# Patient Record
Sex: FEMALE | Race: WHITE | URBAN - METROPOLITAN AREA
[De-identification: names, ages, dates, MRNs, and addresses within clinical notes are randomized per-mention and may not be internally consistent; named-entity substitution may affect disease eponyms.]

---

## 2017-10-01 ENCOUNTER — HOSPITAL ENCOUNTER (OUTPATIENT)
Dept: MEDSURG UNIT | Facility: HOSPITAL | Age: 42
End: 2017-10-02
Attending: OTOLARYNGOLOGY | Admitting: OTOLARYNGOLOGY

## 2017-10-01 LAB
ABS NEUT (OLG): 10.32 X10(3)/MCL (ref 2.1–9.2)
ALBUMIN SERPL-MCNC: 3.5 GM/DL (ref 3.4–5)
ALBUMIN/GLOB SERPL: 1 RATIO (ref 1.1–2)
ALP SERPL-CCNC: 118 UNIT/L (ref 38–126)
ALT SERPL-CCNC: 23 UNIT/L (ref 12–78)
APTT PPP: 30.2 SECOND(S) (ref 24.8–36.9)
AST SERPL-CCNC: 20 UNIT/L (ref 15–37)
BASOPHILS # BLD AUTO: 0 X10(3)/MCL (ref 0–0.2)
BASOPHILS NFR BLD AUTO: 0 %
BILIRUB SERPL-MCNC: 0.3 MG/DL (ref 0.2–1)
BILIRUBIN DIRECT+TOT PNL SERPL-MCNC: 0.1 MG/DL (ref 0–0.5)
BILIRUBIN DIRECT+TOT PNL SERPL-MCNC: 0.2 MG/DL (ref 0–0.8)
BUN SERPL-MCNC: 13 MG/DL (ref 7–18)
CALCIUM SERPL-MCNC: 8.6 MG/DL (ref 8.5–10.1)
CHLORIDE SERPL-SCNC: 102 MMOL/L (ref 98–107)
CO2 SERPL-SCNC: 26 MMOL/L (ref 21–32)
CREAT SERPL-MCNC: 0.85 MG/DL (ref 0.55–1.02)
EOSINOPHIL # BLD AUTO: 0.1 X10(3)/MCL (ref 0–0.9)
EOSINOPHIL NFR BLD AUTO: 0 %
ERYTHROCYTE [DISTWIDTH] IN BLOOD BY AUTOMATED COUNT: 13.2 % (ref 11.5–17)
GLOBULIN SER-MCNC: 3.6 GM/DL (ref 2.4–3.5)
GLUCOSE SERPL-MCNC: 88 MG/DL (ref 74–106)
HCT VFR BLD AUTO: 38.7 % (ref 37–47)
HGB BLD-MCNC: 13.2 GM/DL (ref 12–16)
INR PPP: 0.9 (ref 0–1.27)
LYMPHOCYTES # BLD AUTO: 1.8 X10(3)/MCL (ref 0.6–4.6)
LYMPHOCYTES NFR BLD AUTO: 14 %
MCH RBC QN AUTO: 28.6 PG (ref 27–31)
MCHC RBC AUTO-ENTMCNC: 34.1 GM/DL (ref 33–36)
MCV RBC AUTO: 83.8 FL (ref 80–94)
MONOCYTES # BLD AUTO: 0.8 X10(3)/MCL (ref 0.1–1.3)
MONOCYTES NFR BLD AUTO: 6 %
NEUTROPHILS # BLD AUTO: 10.32 X10(3)/MCL (ref 1.4–7.9)
NEUTROPHILS NFR BLD AUTO: 79 %
PLATELET # BLD AUTO: 251 X10(3)/MCL (ref 130–400)
PMV BLD AUTO: 8.8 FL (ref 9.4–12.4)
POTASSIUM SERPL-SCNC: 3.3 MMOL/L (ref 3.5–5.1)
PROT SERPL-MCNC: 7.1 GM/DL (ref 6.4–8.2)
PROTHROMBIN TIME: 12 SECOND(S) (ref 12.2–14.7)
RBC # BLD AUTO: 4.62 X10(6)/MCL (ref 4.2–5.4)
SODIUM SERPL-SCNC: 139 MMOL/L (ref 136–145)
WBC # SPEC AUTO: 13.1 X10(3)/MCL (ref 4.5–11.5)

## 2022-04-30 NOTE — OP NOTE
DATE OF SURGERY:    10/01/2017    SURGEON:  Veto May Jr., MD    PREOPERATIVE DIAGNOSIS:  Esophageal foreign body.    POSTOPERATIVE DIAGNOSIS:  Esophageal foreign body.    INDICATIONS:  The patient is a pleasant 41-year-old woman, who earlier today was cleaning up a cake from her daughter's baby shower.  She took a swab of icing on her finger and swallowed it and immediately noticed she swallowed a foreign body.  She was unsure exactly what this was.  It was confirmed on lateral film neck x-ray.  Given that, we decided to proceed with exploration and removal of foreign body.    PROCEDURE IN DETAIL:    1. Direct laryngoscopy.  2. Rigid esophagoscopy.  3. Removal of esophageal foreign body.    ANESTHESIA:  General.    COMPLICATIONS:  None.    ESTIMATED BLOOD LOSS:  None.    PROCEDURE IN DETAIL:  The patient was brought to the operating room and was identified by name and clinic number.  She was transferred to the operating room table in supine position.  General anesthesia was induced and orotracheal intubation was uncomplicated.  The head was turned 90 degrees in preparation for the procedure.  Mouth guard was placed to protect the upper teeth.  Rigid esophagoscope was atraumatically entered into the upper esophagus.  Just below the cricopharyngeus was a large silver decorative star.  This was grasped with the operative forceps and the telescope.  It came out without significant trauma and no significant bleeding in the esophagus.     Rigid esophagoscope was reentered and passed the entire length of the esophagus down to the gastroesophageal junction.  There were no further foreign bodies.  No other areas of trauma.  Scope was     removed.  She tolerated this well.  She was turned back over to the anesthesia team to wake up.  She awoke without complications and returned to the recovery room in stable condition.        ______________________________  MD ANDREA River Jr./ADNIEL  DD:  10/01/2017   Time:  06:29PM  DT:  10/02/2017  Time:  07:18AM  Job #:  [id number]

## 2022-04-30 NOTE — DISCHARGE SUMMARY
DISCHARGE DATE:  10/02/2017    CHIEF COMPLAINT:  Esophageal foreign body.    HISTORY OF PRESENT ILLNESS:  Christine Sharp was picking up after her daughter's baby shower, earlier was picking up a cake, took a swallow of icing on her finger from around the cakes border and accidentally swallowed a foreign body.  She was taken to the operating room and after rigid esophagoscopy, as decorative star was removed out of the esophagus.  She tolerated this well, was transferred to the floor, and started progressing her diet and tolerated this well.    PHYSICAL EXAMINATION:  NECK:  Soft.  No crepitus.  Trachea is midline.     HEENT:  No tongue swelling or floor of mouth swelling.  No bleeding from the mouth.    IMPRESSION:  Esophageal foreign body, status post removal.    DISCHARGE INSTRUCTIONS:  She is doing well.  She can advance her diet.  I will see her in short follow up as an outpatient.  She knows the signs or symptoms to look for concerning for mediastinitis and to call sooner should that be the case.        ______________________________  MD ANDREA River Jr./FRANKLIN  DD:  10/11/2017  Time:  07:01PM  DT:  10/12/2017  Time:  03:11PM  Job #:  [id number]

## 2022-04-30 NOTE — ED PROVIDER NOTES
"   Patient:   Christine Sharp            MRN: [id number]            FIN: [id number]               Age:   41 years     Sex:  Female     :  1975   Associated Diagnoses:   Esophageal foreign body   Author:   Tiny VELASCO, Amarjit FAULKENR      Basic Information   Time seen: Date & time 10/1/2017 11:37:00.   History source: Patient.   Arrival mode: Walking.   History limitation: None.   Additional information: Patient's physician(s): Mara VELASCO , NANCY Santiago, Dr. Franks, assumed care of this patient at 1255. , Chief Complaint from Nursing Triage Note : Chief Complaint   10/1/2017 11:34 CDT      Chief Complaint           pt. reports she took bite of cake with "tiny diamonds" in it and when "it went down, she couldn't breathe well"; pt. states she cannot swallow now; pt. able to talk with clear voice in triage, but is crying  .      History of Present Illness   The patient presents with an ingested foreign body, SORT:  Female who reports swallowing decorations on cake now with burning throat discomfort and foreign body sensation.  Jessica CONTRERAS and     41 year old female presents to the ED with complaints of a foreign body sensation after swallowing "fake diamonds and shiny stars" onset PTA. The patient reports that she was at a baby shower when she ate a piece of cake decorated with "fake diamonds and shiny stars" around the base feeling that she had lodged these objects in her throat she was prompted to visit the ED for evaluation. The patient denies a previous history of dysphagia or throat problems. The patient has a history of anxiety, HTN, and depression.      .  The onset was just prior to arrival.  The course/duration of symptoms is constant.  Symptoms: pain and scratchy throat.  Location: esophagus. The type of foreign body is "shiny stars and fake diamonds".  The degree of symptoms is minimal.  The exacerbating factor is none.  The relieving factor is none.  Risk factors consist of none.  Prior " "episodes: none.  Therapy today: none.  Associated symptoms: none.  Additional history: none.        Review of Systems   Constitutional symptoms:  Negative except as documented in HPI.   Skin symptoms:  Negative except as documented in HPI.   Eye symptoms:  Negative except as documented in HPI.   ENMT symptoms:  Negative except as documented in HPI, foreign body sensation after swallowing "shiny stars and fake diamonds".    Respiratory symptoms:  Negative except as documented in HPI.   Cardiovascular symptoms:  Negative except as documented in HPI.   Gastrointestinal symptoms:  Negative except as documented in HPI.   Genitourinary symptoms:  Negative except as documented in HPI.   Musculoskeletal symptoms:  Negative except as documented in HPI.   Neurologic symptoms:  Negative except as documented in HPI.   Psychiatric symptoms:  Negative except as documented in HPI.   Endocrine symptoms:  Negative except as documented in HPI.   Hematologic/Lymphatic symptoms:  Negative except as documented in HPI.   Allergy/immunologic symptoms:  Negative except as documented in HPI.             Additional review of systems information: All other systems reviewed and otherwise negative.      Health Status   Allergies: No known allergies.   Medications:  (Selected)   Inpatient Medications  Completed  glucagon: 1 mg, form: Injection, IM, As Directed, first dose 10/01/17 11:37:00 CDT, 1,263,046  Documented Medications  Documented  ALPRAZOLAM 1 MG TABS: 1 mg = 1 tab(s), Oral, BID  DULOXETINE HCL 60 MG CPEP: 60 mg = 1 cap(s), Oral, Daily  QUETIAPINE FUMARATE 25 MG TABS:   Completed  Percocet 5/325: 1 tab(s), Oral, q3hr, PRN pain, 0 Refill(s)  Percocet 5/325: 2 tab(s), Oral, q3hr, PRN pain, 0 Refill(s).   Immunizations: NONE.   Menstrual history: Hysterectomy.      Past Medical/ Family/ Social History   Medical history:    Active  Endometriosis (5493559400)  Resolved  Hypertension (17615255):  Resolved.  Depression ([id number]):  " Resolved..   Surgical history:    Hysterectomy Total Abdominal w/ BSO (.) performed by John VELASCO, Irene LUX on 3/18/2014 at 38 Years.  Comments:  3/18/2014 08:45 - Mikey BENDER , Rachel DONG  auto-populated from documented surgical case  Cholecystectomy (ICD-9-CM 51.2) on 2010 at 35 Years.   section (SNOMED CT 10827840).  Tubal ligation (SNOMED CT 561030062)..   Family history:    Father  Metastatic cancer  Primary malignant neoplasm of brain  Mother  Hypertension  Brother    History is negative.  Sister  Lung  Uterine  .   Social history: Alcohol use: Denies, Tobacco use: Denies, Drug use: Denies, Occupation: Employed, Family/social situation:  and lives with her six kids.   Problem list: Per nurse's notes.      Physical Examination               Vital Signs   Vital Signs   10/1/2017 11:34 CDT      Temperature Oral          36.8 DegC                             Peripheral Pulse Rate     104 bpm  HI                             Respiratory Rate          24 br/min                             SpO2                      100 %                             Oxygen Therapy            Room air                             Systolic Blood Pressure   176 mmHg  HI                             Diastolic Blood Pressure  131 mmHg  HI  .   Measurements   10/1/2017 11:34 CDT      Weight Dosing             68 kg                             Weight Measured and Calculated in Lbs     149.91 lb                             Weight Estimated          68 kg                             Height/Length Dosing      160 cm                             Height/Length Estimated   160 cm                             Body Mass Index Estimated 26.56 kg/m2  .   Basic Oxygen Information   10/1/2017 12:45 CDT      SpO2                      100 %                             Oxygen Therapy            Room air    10/1/2017 12:43 CDT      SpO2                      98 %    10/1/2017 11:55 CDT      SpO2                      98 %                              Oxygen Therapy            Room air    10/1/2017 11:34 CDT      SpO2                      100 %                             Oxygen Therapy            Room air  .   General:  Alert, no acute distress, anxious, Not ill-appearing,    Skin:  Warm, dry, no rash, normal for ethnicity.    Head:  Normocephalic, atraumatic.    Neck:  Supple, trachea midline, no tenderness, no JVD, no carotid bruit.    Eye:  Pupils are equal, round and reactive to light, extraocular movements are intact.    Ears, nose, mouth and throat:  Tympanic membranes clear, oral mucosa moist, no pharyngeal erythema or exudate.    Cardiovascular:  Regular rate and rhythm, No murmur, Normal peripheral perfusion, No edema.    Respiratory:  Lungs are clear to auscultation, respirations are non-labored, breath sounds are equal.    Chest wall:  No tenderness.   Back:  Nontender, Normal range of motion, Normal alignment, no step-offs.    Musculoskeletal:  Normal ROM, normal strength, no tenderness, no swelling.    Gastrointestinal:  Soft, Nontender, Non distended, Normal bowel sounds, No organomegaly.    Genitourinary:  no CVA tenderness.   Neurological:  Alert and oriented to person, place, time, and situation, No focal neurological deficit observed, CN II-XII intact, normal sensory observed, normal motor observed, normal speech observed, normal coordination observed.    Lymphatics:  No lymphadenopathy.   Psychiatric:  Cooperative, appropriate mood & affect, normal judgment.       Medical Decision Making   Differential Diagnosis:  Foreign body, throat.   Documents reviewed:  Emergency department nurses' notes.   Orders  Launch Order Profile (Selected)   Inpatient Orders  Ordered  Discharge Planning Ongoing Assessment: 10/04/17 9:00:00 CDT, q3day  Completed  XR Chest 2 Views: Stat, 10/01/17 11:37:00 CDT, Other (please specify), swallowed foreign body, None, Stretcher, Rad Type, 10/01/17 11:37:00 CDT  XR Soft Tissue Neck: Stat, 10/01/17 11:37:00 CDT, Other  (please specify), swallowed foreign body, None, Stretcher, Rad Type, 10/01/17 11:37:00 CDT  glucagon: 1 mg, 1 EA, Injection, N/A, Once, Stop date 10/01/17 11:47:14 CDT, Physician Stop, 10/01/17 11:47:14 CDT  glucagon: 1 mg, form: Injection, IM, As Directed, first dose 10/01/17 11:37:00 CDT, 1,263,046.   Results review:  Lab results : Lab View   10/1/2017 13:40 CDT      WBC                       13.1 x10(3)/mcL  HI                             RBC                       4.62 x10(6)/mcL                             Hgb                       13.2 gm/dL                             Hct                       38.7 %                             Platelet                  251 x10(3)/mcL                             MCV                       83.8 fL                             MCH                       28.6 pg                             MCHC                      34.1 gm/dL                             RDW                       13.2 %                             MPV                       8.8 fL  LOW                             Abs Neut                  10.32 x10(3)/mcL  HI                             Neutro Auto               79 %  NA                             Lymph Auto                14 %  NA                             Mono Auto                 6 %  NA                             Eos Auto                  0 %  NA                             Abs Eos                   0.1 x10(3)/mcL                             Basophil Auto             0 %  NA                             Abs Neutro                10.32 x10(3)/mcL  HI                             Abs Lymph                 1.8 x10(3)/mcL                             Abs Mono                  0.8 x10(3)/mcL                             Abs Baso                  0.0 x10(3)/mcL  .   Chest X-Ray:  Reviewed by radiology                * Final Report *    Reason For Exam  swallowed foreign body;Other (please specify)    Radiology Report     XR Chest 2 Views     REASON FOR STUDY: swallowed  foreign body;Other (please specify)     Comparison: None.     FINDINGS:     Heart, mediastinum and vascularity are normal. Lungs are well expanded  and clear. There is no pleural effusion or pneumothorax.     IMPRESSION:     NORMAL CHEST.       Signature Line  Electronically Signed By: Amarjit Mosqueda MD  Date/Time Signed: 10/01/2017 12:31.   C-Spine X-Ray:  Time reported 10/1/2017 12:30:00, interpretation by Radiologist,            * Final Report *    Reason For Exam  swallowed foreign body;Other (please specify)    Radiology Report     SOFT TISSUE NECK AP AND LATERAL VIEWS:     REASON FOR EXAM: swallowed foreign body;Other (please specify)     COMPARISON: None     FINDINGS:     It is unknown what type of foreign body was ingested. But there is a 1  cm linear density in the prevertebral soft tissues at the C6 level  seen on the lateral view. This could be foreign body in the esophagus.  Prevertebral soft tissues are otherwise unremarkable. Epiglottis and  aryepiglottic folds are normal. Airway is normal.     Impression:     As above.       Signature Line  Electronically Signed By: Amarjit Mosqueda MD  Date/Time Signed: 10/01/2017 12:30.       Reexamination/ Reevaluation   Time: 10/1/2017 13:09:00 .   Assessment: Patient has a foreign body anterior to C6 in the esophagus.      Impression and Plan   Diagnosis   Esophageal foreign body ([id number] T18.108A)      Calls-Consults   -  10/1/2017 13:00:00 , Veto Duff MD, White Jr MD, James R.    Plan   Condition: Unchanged.    Disposition: Dr. May ear nose and throat came to merge part to see and evaluate the patient.  I had previously spoken with Dr. Duff.  As the foreign body was in the esophagus.  But he felt it was too high for him to be effective and that we are nose and throat was more appropriate, Patient care transitioned to: Time: 10/1/2017 14:10:00, Veto May Jr, MD    Counseled: Patient, Regarding diagnosis, Regarding diagnostic results,  Regarding treatment plan, Patient indicated understanding of instructions.    Notes: I, Saray Sin, acted solely as a scribe for and in the presence of Dr. Franks who performed the service.  , I, Amarjit Franks MD, a physician licensed to practice in this state, have  performed the physical evaluation,  history gathering,  and medical decision making that is reflected in this record..   GEORGE Franks MD.

## 2022-04-30 NOTE — H&P
* Preliminary Report *    CN (Unverified)  DATE OF CONSULTATION:  10/01/2017    ATTENDING PHYSICIAN:  Physician ER  CONSULTING PHYSICIAN:  Veto May Jr., MD    CHIEF COMPLAINT:  Esophageal foreign body.    HISTORY OF PRESENT ILLNESS:  The patient is a pleasant 41-year-old woman who presents to the emergency room with odynophagia and concern for esophageal foreign body.  She was cleaning up after her daughter's baby shower and took a bite of icing off the cake board.  Reportedly, there were tiny decorative objects such as shiny diamonds and fake stars that were around the cake itself.  She thinks one of these may be the culprit.  Plain film was obtained in the ER, which does not show anything obvious on the AP view, but on the lateral view does show a linear object just below the level of the cricopharyngeus at around C6 or C7.     She has not had any fever, chills, tachycardia, or other signs of mediastinitis or perforation.  There has been no hemoptysis.  She does have some discomfort and she points right to her suprasternal notch.  There has been no change in her voice.  No change in her breathing.  She actually can functionally swallow her saliva.  She tells me she has not eaten much today with the exception of that lick of icing.    REVIEW OF SYSTEMS:  Otherwise negative.    PAST MEDICAL HISTORY:  Hypertension, depression, anxiety, and endometriosis.    PAST SURGICAL HISTORY:  Total abdominal hysterectomy with BSO, cholecystectomy, , tubal ligation.    FAMILY HISTORY:  Dad had metastatic cancer with brain malignancy.  Her mother had hypertension.    SOCIAL HISTORY:  No significant alcohol or tobacco use.    PHYSICAL EXAMINATION:  GENERAL:  Alert and oriented.  No acute distress.   HEENT:  ENT:  Her voice is strong.  There is no raspiness to her voice.  There is no labored breathing, no stridor, no hemoptysis.  Neck:  Soft and supple.  Trachea is midline.  There is no crepitus in the  neck.  Dentition in fair repair.  No oral cavity lesions.  No oropharyngeal findings.    IMPRESSION:  Esophageal foreign body.     Looking at the x-ray, it seems like whatever she swallowed is in the upper esophagus at this point in time.  It looks like it could be fairly sharp, so I want to get to the operating room on an urgent basis.  Also, do not want this thing to migrate down any further.  That being said, there are cases currently going on in the operating room right now with no extra teams to add this on, so we are going to be a to-follow case.  I did express the situation of the patient and the nature of this foreign body os they will work to get us in as quick as possible.     I will have the ER start her on some Decadron and also some IV Unasyn in the meantime.        ______________________________  Veto May Jr., MD    JRW/UD  DD:  10/01/2017  Time:  02:35PM  DT:  10/01/2017  Time:  03:38PM  Job #:  [id number]       Result type: Consultation Note  Result date: October 01, 2017 15:38 CDT  Result status: Unauth  Result title: CN  Performed by: Veto May Jr, MD on October 01, 2017 14:35 CDT  Encounter info: [id number][facility] 10/1/2017 - 10/2/2017  Contributor system: [employer]